# Patient Record
Sex: FEMALE | Race: WHITE
[De-identification: names, ages, dates, MRNs, and addresses within clinical notes are randomized per-mention and may not be internally consistent; named-entity substitution may affect disease eponyms.]

---

## 2017-12-07 ENCOUNTER — HOSPITAL ENCOUNTER (OUTPATIENT)
Dept: HOSPITAL 19 - MC.RAD | Age: 62
End: 2017-12-07
Attending: FAMILY MEDICINE
Payer: COMMERCIAL

## 2017-12-07 DIAGNOSIS — Z12.31: Primary | ICD-10-CM

## 2018-12-10 ENCOUNTER — HOSPITAL ENCOUNTER (OUTPATIENT)
Dept: HOSPITAL 19 - MC.RAD | Age: 63
End: 2018-12-10
Attending: FAMILY MEDICINE
Payer: MEDICARE

## 2018-12-10 DIAGNOSIS — Z12.31: Primary | ICD-10-CM

## 2019-04-16 ENCOUNTER — HOSPITAL ENCOUNTER (EMERGENCY)
Dept: HOSPITAL 19 - COL.ER | Age: 64
Discharge: HOME | End: 2019-04-16
Payer: MEDICARE

## 2019-04-16 VITALS — HEART RATE: 85 BPM

## 2019-04-16 VITALS — DIASTOLIC BLOOD PRESSURE: 73 MMHG | TEMPERATURE: 98 F | SYSTOLIC BLOOD PRESSURE: 164 MMHG

## 2019-04-16 VITALS — HEIGHT: 60.98 IN | WEIGHT: 135.36 LBS | BODY MASS INDEX: 25.56 KG/M2

## 2019-04-16 DIAGNOSIS — S02.2XXA: Primary | ICD-10-CM
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
## 2019-04-16 DIAGNOSIS — I10: ICD-10-CM

## 2019-04-16 DIAGNOSIS — Z88.2: ICD-10-CM

## 2019-04-16 DIAGNOSIS — E11.9: ICD-10-CM
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
## 2019-12-13 ENCOUNTER — HOSPITAL ENCOUNTER (OUTPATIENT)
Dept: HOSPITAL 19 - MC.RAD | Age: 64
End: 2019-12-13
Attending: FAMILY MEDICINE
Payer: MEDICARE

## 2019-12-13 DIAGNOSIS — Z12.31: Primary | ICD-10-CM

## 2020-12-07 ENCOUNTER — HOSPITAL ENCOUNTER (OUTPATIENT)
Dept: HOSPITAL 19 - MC.RAD | Age: 65
End: 2020-12-07
Attending: FAMILY MEDICINE
Payer: MEDICARE

## 2020-12-07 DIAGNOSIS — Z12.31: Primary | ICD-10-CM

## 2021-12-09 ENCOUNTER — HOSPITAL ENCOUNTER (OUTPATIENT)
Dept: HOSPITAL 19 - MC.RAD | Age: 66
End: 2021-12-09
Attending: FAMILY MEDICINE
Payer: MEDICARE

## 2021-12-09 DIAGNOSIS — Z12.31: Primary | ICD-10-CM

## 2022-09-12 ENCOUNTER — HOSPITAL ENCOUNTER (OUTPATIENT)
Dept: HOSPITAL 19 - COL.RAD | Age: 67
End: 2022-09-12
Attending: UROLOGY
Payer: MEDICARE

## 2022-09-12 DIAGNOSIS — Z87.442: Primary | ICD-10-CM

## 2023-03-23 ENCOUNTER — HOSPITAL ENCOUNTER (EMERGENCY)
Dept: HOSPITAL 19 - COL.ER | Age: 68
Discharge: HOME | End: 2023-03-23
Attending: EMERGENCY MEDICINE
Payer: MEDICARE

## 2023-03-23 VITALS — BODY MASS INDEX: 29.52 KG/M2 | HEIGHT: 60 IN | WEIGHT: 150.36 LBS

## 2023-03-23 VITALS — HEART RATE: 97 BPM | SYSTOLIC BLOOD PRESSURE: 155 MMHG | DIASTOLIC BLOOD PRESSURE: 85 MMHG

## 2023-03-23 VITALS — TEMPERATURE: 98.2 F

## 2023-03-23 DIAGNOSIS — D72.829: ICD-10-CM

## 2023-03-23 DIAGNOSIS — M96.89: ICD-10-CM

## 2023-03-23 DIAGNOSIS — E86.0: Primary | ICD-10-CM

## 2023-03-23 DIAGNOSIS — E11.65: ICD-10-CM

## 2023-03-23 DIAGNOSIS — Z28.310: ICD-10-CM

## 2023-03-23 DIAGNOSIS — R79.81: ICD-10-CM

## 2023-03-23 DIAGNOSIS — Z96.652: ICD-10-CM

## 2023-03-23 DIAGNOSIS — Z79.84: ICD-10-CM

## 2023-03-23 DIAGNOSIS — R11.2: ICD-10-CM

## 2023-03-23 LAB
ALBUMIN SERPL-MCNC: 3.4 GM/DL (ref 3.4–4.8)
ALP SERPL-CCNC: 97 U/L (ref 40–150)
ALT SERPL-CCNC: 30 U/L (ref 0–55)
ANION GAP SERPL CALC-SCNC: 22 MMOL/L (ref 7–16)
AST SERPL-CCNC: 13 U/L (ref 5–34)
BILIRUB SERPL-MCNC: 0.5 MG/DL (ref 0.2–1.2)
BUN SERPL-MCNC: 17 MG/DL (ref 10–20)
CALCIUM SERPL-MCNC: 9.6 MG/DL (ref 8.4–10.2)
CHLORIDE SERPL-SCNC: 96 MMOL/L (ref 98–107)
CO2 SERPL-SCNC: 11 MMOL/L (ref 23–31)
CREAT SERPL-SCNC: 1.05 MG/DL (ref 0.57–1.11)
ERYTHROCYTE [DISTWIDTH] IN BLOOD BY AUTOMATED COUNT: 12.8 % (ref 11.5–14.5)
GLUCOSE SERPL-MCNC: 327 MG/DL (ref 70–99)
HCT VFR BLD AUTO: 39.9 % (ref 37–47)
HGB BLD-MCNC: 13.1 G/DL (ref 12.5–16)
LYMPHOCYTES NFR BLD MANUAL: 9 % (ref 20–51)
MCH RBC QN AUTO: 32 PG (ref 27–31)
MCHC RBC AUTO-ENTMCNC: 33 G/DL (ref 33–37)
MCV RBC AUTO: 98 FL (ref 80–100)
MONOCYTES NFR BLD: 5 % (ref 1.7–9.3)
NEUTS BAND NFR BLD: 10 % (ref 0–10)
NEUTS SEG NFR BLD MANUAL: 76 % (ref 42–75.2)
PLATELET # BLD AUTO: 388 K/MM3 (ref 130–400)
PLATELET BLD QL SMEAR: (no result)
PMV BLD AUTO: 9 FL (ref 7.4–10.4)
POTASSIUM SERPL-SCNC: 4.6 MMOL/L (ref 3.5–4.5)
PROT SERPL-MCNC: 7.2 GM/DL (ref 6.2–8.1)
RBC # BLD AUTO: 4.08 M/MM3 (ref 4.1–5.3)
SODIUM SERPL-SCNC: 129 MMOL/L (ref 136–145)

## 2023-03-25 ENCOUNTER — HOSPITAL ENCOUNTER (INPATIENT)
Dept: HOSPITAL 19 - COL.ER | Age: 68
LOS: 3 days | Discharge: NURSING, REHAB OR LONG-TERM CARE | DRG: 637 | End: 2023-03-28
Attending: INTERNAL MEDICINE | Admitting: INTERNAL MEDICINE
Payer: MEDICARE

## 2023-03-25 VITALS — HEART RATE: 108 BPM | DIASTOLIC BLOOD PRESSURE: 54 MMHG | TEMPERATURE: 98.7 F | SYSTOLIC BLOOD PRESSURE: 117 MMHG

## 2023-03-25 VITALS — TEMPERATURE: 99 F | DIASTOLIC BLOOD PRESSURE: 54 MMHG | SYSTOLIC BLOOD PRESSURE: 137 MMHG | HEART RATE: 100 BPM

## 2023-03-25 VITALS — SYSTOLIC BLOOD PRESSURE: 131 MMHG | TEMPERATURE: 98.8 F | HEART RATE: 99 BPM | DIASTOLIC BLOOD PRESSURE: 68 MMHG

## 2023-03-25 VITALS — BODY MASS INDEX: 28.39 KG/M2 | WEIGHT: 150.36 LBS | HEIGHT: 60.98 IN

## 2023-03-25 VITALS — HEART RATE: 105 BPM | SYSTOLIC BLOOD PRESSURE: 134 MMHG | TEMPERATURE: 98.6 F | DIASTOLIC BLOOD PRESSURE: 52 MMHG

## 2023-03-25 VITALS — TEMPERATURE: 97.5 F | SYSTOLIC BLOOD PRESSURE: 126 MMHG | HEART RATE: 111 BPM | DIASTOLIC BLOOD PRESSURE: 51 MMHG

## 2023-03-25 DIAGNOSIS — Z90.49: ICD-10-CM

## 2023-03-25 DIAGNOSIS — Z79.899: ICD-10-CM

## 2023-03-25 DIAGNOSIS — N17.9: ICD-10-CM

## 2023-03-25 DIAGNOSIS — Z23: ICD-10-CM

## 2023-03-25 DIAGNOSIS — Z20.822: ICD-10-CM

## 2023-03-25 DIAGNOSIS — Z88.2: ICD-10-CM

## 2023-03-25 DIAGNOSIS — Z79.82: ICD-10-CM

## 2023-03-25 DIAGNOSIS — Z96.652: ICD-10-CM

## 2023-03-25 DIAGNOSIS — E11.65: ICD-10-CM

## 2023-03-25 DIAGNOSIS — Z90.710: ICD-10-CM

## 2023-03-25 DIAGNOSIS — T41.45XA: ICD-10-CM

## 2023-03-25 DIAGNOSIS — I10: ICD-10-CM

## 2023-03-25 DIAGNOSIS — E11.10: Primary | ICD-10-CM

## 2023-03-25 DIAGNOSIS — Z79.890: ICD-10-CM

## 2023-03-25 DIAGNOSIS — Z90.89: ICD-10-CM

## 2023-03-25 DIAGNOSIS — Z88.0: ICD-10-CM

## 2023-03-25 DIAGNOSIS — E87.6: ICD-10-CM

## 2023-03-25 DIAGNOSIS — Z79.4: ICD-10-CM

## 2023-03-25 DIAGNOSIS — E83.42: ICD-10-CM

## 2023-03-25 DIAGNOSIS — G93.41: ICD-10-CM

## 2023-03-25 DIAGNOSIS — T50.995A: ICD-10-CM

## 2023-03-25 DIAGNOSIS — E03.9: ICD-10-CM

## 2023-03-25 DIAGNOSIS — Z88.8: ICD-10-CM

## 2023-03-25 DIAGNOSIS — D64.9: ICD-10-CM

## 2023-03-25 LAB
ACETONE SERPL-MCNC: (no result) MG/DL
ALBUMIN SERPL-MCNC: 2.9 GM/DL (ref 3.4–4.8)
ALP SERPL-CCNC: 101 U/L (ref 40–150)
ALT SERPL-CCNC: 19 U/L (ref 0–55)
ANION GAP SERPL CALC-SCNC: 11 MMOL/L (ref 7–16)
ANION GAP SERPL CALC-SCNC: 15 MMOL/L (ref 7–16)
ANION GAP SERPL CALC-SCNC: 17 MMOL/L (ref 7–16)
AST SERPL-CCNC: 11 U/L (ref 5–34)
BASOPHILS # BLD: 0.1 K/MM3 (ref 0–0.2)
BASOPHILS NFR BLD AUTO: 0.5 % (ref 0–2)
BILIRUB SERPL-MCNC: 0.4 MG/DL (ref 0.2–1.2)
BUN SERPL-MCNC: 13 MG/DL (ref 10–20)
BUN SERPL-MCNC: 17 MG/DL (ref 10–20)
BUN SERPL-MCNC: 19 MG/DL (ref 10–20)
CALCIUM SERPL-MCNC: 10.5 MG/DL (ref 8.4–10.2)
CALCIUM SERPL-MCNC: 8.7 MG/DL (ref 8.4–10.2)
CALCIUM SERPL-MCNC: 9.8 MG/DL (ref 8.4–10.2)
CHLORIDE SERPL-SCNC: 103 MMOL/L (ref 98–107)
CHLORIDE SERPL-SCNC: 104 MMOL/L (ref 98–107)
CHLORIDE SERPL-SCNC: 111 MMOL/L (ref 98–107)
CO2 SERPL-SCNC: 10 MMOL/L (ref 23–31)
CO2 SERPL-SCNC: 12 MMOL/L (ref 23–31)
CO2 SERPL-SCNC: 14 MMOL/L (ref 23–31)
CREAT SERPL-SCNC: 0.91 MG/DL (ref 0.57–1.11)
CREAT SERPL-SCNC: 1.13 MG/DL (ref 0.57–1.11)
CREAT SERPL-SCNC: 1.33 MG/DL (ref 0.57–1.11)
EOSINOPHIL # BLD: 0.1 K/MM3 (ref 0–0.7)
EOSINOPHIL NFR BLD: 0.6 % (ref 0–4)
ERYTHROCYTE [DISTWIDTH] IN BLOOD BY AUTOMATED COUNT: 13 % (ref 11.5–14.5)
GLUCOSE SERPL-MCNC: 141 MG/DL (ref 70–99)
GLUCOSE SERPL-MCNC: 328 MG/DL (ref 70–99)
GLUCOSE SERPL-MCNC: 383 MG/DL (ref 70–99)
GLUCOSE UR QL STRIP.AUTO: (no result)
GRAN CASTS #/AREA URNS LPF: (no result) /LPF
GRANULOCYTES # BLD AUTO: 79.4 % (ref 42.2–75.2)
HCT VFR BLD AUTO: 37.8 % (ref 37–47)
HGB BLD-MCNC: 12.1 G/DL (ref 12.5–16)
KETONES UR STRIP.AUTO-MCNC: (no result) MG/DL
LIPASE SERPL-CCNC: 11 U/L (ref 8–78)
LYMPHOCYTES # BLD: 1.1 K/MM3 (ref 1.2–3.4)
LYMPHOCYTES NFR BLD: 8.1 % (ref 20–51)
MAGNESIUM SERPL-MCNC: 1.5 MG/DL (ref 1.6–2.6)
MCH RBC QN AUTO: 31 PG (ref 27–31)
MCHC RBC AUTO-ENTMCNC: 32 G/DL (ref 33–37)
MCV RBC AUTO: 98 FL (ref 80–100)
MONOCYTES # BLD: 1.5 K/MM3 (ref 0.1–0.6)
MONOCYTES NFR BLD AUTO: 11 % (ref 1.7–9.3)
NEUTROPHILS # BLD: 10.7 K/MM3 (ref 1.4–6.5)
PH UR STRIP.AUTO: 5.5 [PH] (ref 5–8.5)
PLATELET # BLD AUTO: 402 K/MM3 (ref 130–400)
PMV BLD AUTO: 9 FL (ref 7.4–10.4)
POTASSIUM SERPL-SCNC: 3.4 MMOL/L (ref 3.5–4.5)
POTASSIUM SERPL-SCNC: 4.3 MMOL/L (ref 3.5–4.5)
POTASSIUM SERPL-SCNC: 4.8 MMOL/L (ref 3.5–4.5)
PROT SERPL-MCNC: 6.7 GM/DL (ref 6.2–8.1)
RBC # BLD AUTO: 3.86 M/MM3 (ref 4.1–5.3)
RBC # UR STRIP.AUTO: (no result) /UL
SODIUM SERPL-SCNC: 130 MMOL/L (ref 136–145)
SODIUM SERPL-SCNC: 131 MMOL/L (ref 136–145)
SODIUM SERPL-SCNC: 136 MMOL/L (ref 136–145)
SP GR UR STRIP.AUTO: 1.01 (ref 1–1.03)
SQUAMOUS # URNS: (no result) /HPF (ref 0–10)
TROPONIN I SERPL-MCNC: < 0.01 NG/ML (ref 0–0.03)
TSH SERPL DL<=0.005 MIU/L-ACNC: 1.64 UIU/ML (ref 0.35–4.94)
UA DIPSTICK PNL UR STRIP.AUTO: (no result)
URN COLLECT METHOD CLASS: (no result)
UROBILINOGEN UR STRIP.AUTO-MCNC: 0.2 E.U/DL (ref 0.2–1)

## 2023-03-25 NOTE — NUR
PT recently up from ED.  Pt is alert and oriented although she is slow to
answer.  When she arrived to the floor, assisted her to the restroom.  She was
slow at moving, but improved during transfer.  Left knee is stiff, encouraged
her to bend her knee as much as she can when walking as she was trying to keep
it straight.  Family stated that she appears to be a little more perky now
than when they first brought her in.  IVf to IV in right AC.  Bed alarm on.

## 2023-03-25 NOTE — NUR
Pt using commode as she is having complaints of pain when trying to bend the
left knee.  Encouraged pt to work on heel slides while in bed to start
bending her knee.  Pt does have bruising to her left thigh and bruising to
left knee.  Pt continues to answer most questions appropriately, but does
struggle with following commands.  When asking her to do something, i have to
repeat myself several times.  Bed alarm on, call light within reach

## 2023-03-25 NOTE — NUR
Pt  is at bedside now.  Pt using commode as she is weak and tires
quickly.  Pt did walk to the sink and brush her teeth.  Pt still not
completely comprehending what is asked of her.  Have to repeat myself and
demonstrate sometimes what i am asking of her.  I have been ordering small
more frequent meals for her and she has been tolerating her meals

## 2023-03-25 NOTE — NUR
met with patient for intake assessment/discharge planning.
Patient presents alert and is oriented X 3, she is able to respond to
assessment inquiries though requires some repetition and demonstrates a slowed
response. He states she lives in [place], [place], in a 1-story home. She reports no
use of DME and/or oxygen and she has been independent in her ADLs/IADLs, up to
this point. She is aware of her confusion/altered mental status, becoming
tearful stating, "This is not me." She does not know what is happening to her
to be confused. Patient's primary care physician is Dr. Flaco Martinez and she
obtains her medications without difficulty at [facility]. She
struggles to find her daughter's telephone contact in her phone, Elsie Ramos. She states, "I can't find it." She does not wish for help at this time
to search her phone. She states she does have a [employer]arde[employer] system for blood sugar
monitoring at home. Patient is unable to identify any needs for discharge at
this time, "I don't know. I can't think right now." She expresses frustration
as she is employed and educated.
 
Leanna MARTE is updated.
 
*Discharge plan: re-assess patient for identified needs upon medical
clearance*

## 2023-03-25 NOTE — NUR
PT ASSISTED TO BSC WITH 1 ASSIST/GAIT BELT/WALKER, DOES FAIR. NEEDS CUEING TO
MOVE LEFT LEG AND HOW TO SIT ON BSC. VOIDS AND BACK TO BED. LEFT KNEE WITH
AQUACEL DRSG D/I. MILD EDEMA NOTED TO LT KNEE. PLACED NEW ICE PACK ON SITE.
HAS IVF AT 250CC/HR TO RAC, INFUSING WITHOUT REDNESS OR SWELLING. PT ASKS FOR
A CATHETER, EDUCATED ON IMPORTANCE OF PREVENTING INFECTION AND MOBILITY,
VERBALIZED UNDERSTANDING. TAKES SCHEDULED ES TYLENOL WITHOUT PROBLEM.

## 2023-03-26 VITALS — SYSTOLIC BLOOD PRESSURE: 128 MMHG | TEMPERATURE: 97.9 F | HEART RATE: 97 BPM | DIASTOLIC BLOOD PRESSURE: 56 MMHG

## 2023-03-26 VITALS — TEMPERATURE: 98.2 F | HEART RATE: 94 BPM | DIASTOLIC BLOOD PRESSURE: 64 MMHG | SYSTOLIC BLOOD PRESSURE: 158 MMHG

## 2023-03-26 VITALS — HEART RATE: 98 BPM | TEMPERATURE: 98.9 F | DIASTOLIC BLOOD PRESSURE: 64 MMHG | SYSTOLIC BLOOD PRESSURE: 152 MMHG

## 2023-03-26 VITALS — TEMPERATURE: 98.8 F | HEART RATE: 100 BPM | SYSTOLIC BLOOD PRESSURE: 146 MMHG | DIASTOLIC BLOOD PRESSURE: 69 MMHG

## 2023-03-26 VITALS — TEMPERATURE: 98.3 F | HEART RATE: 93 BPM | SYSTOLIC BLOOD PRESSURE: 135 MMHG | DIASTOLIC BLOOD PRESSURE: 65 MMHG

## 2023-03-26 LAB
ANION GAP SERPL CALC-SCNC: 6 MMOL/L (ref 7–16)
ANION GAP SERPL CALC-SCNC: 8 MMOL/L (ref 7–16)
BASOPHILS # BLD: 0.1 K/MM3 (ref 0–0.2)
BASOPHILS NFR BLD AUTO: 0.7 % (ref 0–2)
BUN SERPL-MCNC: 10 MG/DL (ref 10–20)
BUN SERPL-MCNC: 9 MG/DL (ref 10–20)
CALCIUM SERPL-MCNC: 8 MG/DL (ref 8.4–10.2)
CALCIUM SERPL-MCNC: 8.1 MG/DL (ref 8.4–10.2)
CHLORIDE SERPL-SCNC: 115 MMOL/L (ref 98–107)
CHLORIDE SERPL-SCNC: 116 MMOL/L (ref 98–107)
CO2 SERPL-SCNC: 15 MMOL/L (ref 23–31)
CO2 SERPL-SCNC: 16 MMOL/L (ref 23–31)
CREAT SERPL-SCNC: 0.71 MG/DL (ref 0.57–1.11)
CREAT SERPL-SCNC: 0.75 MG/DL (ref 0.57–1.11)
EOSINOPHIL # BLD: 0.2 K/MM3 (ref 0–0.7)
EOSINOPHIL NFR BLD: 2 % (ref 0–4)
ERYTHROCYTE [DISTWIDTH] IN BLOOD BY AUTOMATED COUNT: 13.2 % (ref 11.5–14.5)
GLUCOSE SERPL-MCNC: 115 MG/DL (ref 70–99)
GLUCOSE SERPL-MCNC: 79 MG/DL (ref 70–99)
GRANULOCYTES # BLD AUTO: 70 % (ref 42.2–75.2)
HCT VFR BLD AUTO: 28.2 % (ref 37–47)
HGB BLD-MCNC: 9.4 G/DL (ref 12.5–16)
LYMPHOCYTES # BLD: 1.1 K/MM3 (ref 1.2–3.4)
LYMPHOCYTES NFR BLD: 14.8 % (ref 20–51)
MAGNESIUM SERPL-MCNC: 2.1 MG/DL (ref 1.6–2.6)
MCH RBC QN AUTO: 31 PG (ref 27–31)
MCHC RBC AUTO-ENTMCNC: 33 G/DL (ref 33–37)
MCV RBC AUTO: 94 FL (ref 80–100)
MONOCYTES # BLD: 0.9 K/MM3 (ref 0.1–0.6)
MONOCYTES NFR BLD AUTO: 12.1 % (ref 1.7–9.3)
NEUTROPHILS # BLD: 5.2 K/MM3 (ref 1.4–6.5)
PLATELET # BLD AUTO: 356 K/MM3 (ref 130–400)
PMV BLD AUTO: 9.2 FL (ref 7.4–10.4)
POTASSIUM SERPL-SCNC: 3.8 MMOL/L (ref 3.5–4.5)
POTASSIUM SERPL-SCNC: 4.1 MMOL/L (ref 3.5–4.5)
RBC # BLD AUTO: 3 M/MM3 (ref 4.1–5.3)
SODIUM SERPL-SCNC: 138 MMOL/L (ref 136–145)
SODIUM SERPL-SCNC: 138 MMOL/L (ref 136–145)

## 2023-03-26 NOTE — NUR
PT ASSISTED TO BSC WITH ONE ASSIST, VOIDS AND BACK TO BED. DENIES NEED FOR
PAIN MEDS AT THIS TIME. POTASSIUM REPLACEMENT COMPLETE. IVF TO RAC INFUSING AT
250CC/HR.

## 2023-03-26 NOTE — NUR
Pt has continued to improve throughout the afternoon.  Some things she is
still forgetful on and she does get her words mixed up at times.  When this
happens, she does correct herself right away.  Spouse has been here most of
the afternoon as well as several other visitors.  Pt tolerating her food
although she is not eating much at a time.  She is ambulating to the restroom
using a walker.  No needs, will continue to monitor

## 2023-03-26 NOTE — NUR
Pt appears to be doing much better than yesterday.  She is following
directions and is getting up out of bed easier.  Assisted pt to the commode,
voided without difficulty.  Assisted with ordering breakfast

## 2023-03-26 NOTE — NUR
Pt blood sugar 67, pct gave pt orange juice per pt request.  Will recheck bs
prior to giving the glucose tabs since she had the orange juice.

## 2023-03-26 NOTE — NUR
PT REPORTS FEELING LIKE SHE HAS A LOW BLOOD SUGAR, SHE IS SWEATY. BS=39. GIVEN
IV DEXTROSE 12.5GM NOW.

## 2023-03-26 NOTE — NUR
REPEAT BS=95MG/DL. PT HAS BEEN UP TO BATHROOM, VOIDS AND BACK TO BED. GAIT
STEADY. SIGNIFICANT BRUISING TO LT INNER THIGH TO CALF. RESTARTED IV SITE TO
LT WRIST, #20 INSYTE ON FIRST ATTEMPT. DC'D RAC SITE, ANGIOCATH INTACT. IVF
INFUSING AT 150CC/HR. PT FEELS BETTER. DID DRINK AN ORANGE JUICE PER HER
REQUEST.

## 2023-03-26 NOTE — NUR
PT ASSISTED TO BSC WITH ONE ASSIST/GAIT BELT/WALKER. DOES WELL, SLOW AND
STEADY. PT IS MORE AWAKE AND IS MOVING MUCH BETTER THAN BEGINNING OF SHIFT. PT
REPORTS MINIMAL PAIN TO LEFT KNEE, ONLY WITH ACTIVITY. IVF CONTINUE WITH
POTASSIUM REPLACEMENT TO RAC, SITE WITHOUT REDNESS OR SWELLING. BACK TO BED.
VOIDS LIGHT YELLOW URINE.

## 2023-03-26 NOTE — NUR
Pt continues to improve.  PT has been in and worked with her. Pts spouse is at
bedside at this time, gave him an update.  Dr Garcia in to see pt, IVF
decreased per order.  Pt ambulating to the restroom now with one assist.  Pt
tolerable

## 2023-03-27 VITALS — TEMPERATURE: 98.3 F | DIASTOLIC BLOOD PRESSURE: 77 MMHG | SYSTOLIC BLOOD PRESSURE: 157 MMHG | HEART RATE: 98 BPM

## 2023-03-27 VITALS — TEMPERATURE: 98.7 F | SYSTOLIC BLOOD PRESSURE: 154 MMHG | DIASTOLIC BLOOD PRESSURE: 78 MMHG | HEART RATE: 83 BPM

## 2023-03-27 VITALS — HEART RATE: 94 BPM | SYSTOLIC BLOOD PRESSURE: 140 MMHG | DIASTOLIC BLOOD PRESSURE: 78 MMHG | TEMPERATURE: 97.9 F

## 2023-03-27 VITALS — HEART RATE: 97 BPM | TEMPERATURE: 98.5 F | SYSTOLIC BLOOD PRESSURE: 150 MMHG | DIASTOLIC BLOOD PRESSURE: 70 MMHG

## 2023-03-27 VITALS — DIASTOLIC BLOOD PRESSURE: 67 MMHG | SYSTOLIC BLOOD PRESSURE: 161 MMHG | TEMPERATURE: 98.5 F | HEART RATE: 100 BPM

## 2023-03-27 VITALS — SYSTOLIC BLOOD PRESSURE: 145 MMHG | TEMPERATURE: 98.6 F | DIASTOLIC BLOOD PRESSURE: 87 MMHG | HEART RATE: 91 BPM

## 2023-03-27 VITALS — HEART RATE: 97 BPM | SYSTOLIC BLOOD PRESSURE: 135 MMHG | DIASTOLIC BLOOD PRESSURE: 62 MMHG | TEMPERATURE: 98.5 F

## 2023-03-27 LAB
ANION GAP SERPL CALC-SCNC: 10 MMOL/L (ref 7–16)
BUN SERPL-MCNC: 6 MG/DL (ref 10–20)
CALCIUM SERPL-MCNC: 8.5 MG/DL (ref 8.4–10.2)
CHLORIDE SERPL-SCNC: 111 MMOL/L (ref 98–107)
CO2 SERPL-SCNC: 22 MMOL/L (ref 23–31)
CREAT SERPL-SCNC: 0.6 MG/DL (ref 0.57–1.11)
GLUCOSE SERPL-MCNC: 139 MG/DL (ref 70–99)
MAGNESIUM SERPL-MCNC: 1.5 MG/DL (ref 1.6–2.6)
POTASSIUM SERPL-SCNC: 3.3 MMOL/L (ref 3.5–4.5)
SODIUM SERPL-SCNC: 143 MMOL/L (ref 136–145)

## 2023-03-27 NOTE — NUR
Initial visit; Patient talked and was receptive to  visit. Patient
thanked  for her honesty and for keeping her in her prayers. 
mentioned it is a blessing for her to do what she does; especially meeting
good people.

## 2023-03-27 NOTE — NUR
met with patient to follow up on discharge plan. Patient plans
to return home at time of discharge with her , Reno. JENNI discussed Home
Health services with patient who advised she may need these services, but
would like to speak with [place] about it first before deciding. JENNI provided
[email or web address].gov list of HH agencies that serve her area.
 
Discharge Plan: Home with HH

## 2023-03-27 NOTE — NUR
Patient requested to meet with  to discuss discharge plan.
Patient advised her children thought she needed to look into SNF. SW provided
Medicare.gov list of SNFs for patient and she advised she would review this
with her  and talk with SW in the morning.

## 2023-03-27 NOTE — NUR
PT DEXCOM ALERTING, PT ASKING FOR OJ AND PEANUT BUTTER FOR LOW READING OF 66
ON HER MONITOR. IVF INFUSING TO LFA WITHOUT PROBLEM. VOIDING WELL, GAIT STEADY
WITH WALKER AND ASSIST OF 1.

## 2023-03-27 NOTE — NUR
PT WORKING WITH THERAPY THIS AM. POSSIBLE DISCHARGE LATER TODAY. DRESSING TO
LEFT KNEE CDI. UP TO SHOWER WITH ASSIST. PT TOLERATING WELL.

## 2023-03-28 VITALS — SYSTOLIC BLOOD PRESSURE: 160 MMHG | DIASTOLIC BLOOD PRESSURE: 72 MMHG | TEMPERATURE: 97.8 F | HEART RATE: 81 BPM

## 2023-03-28 VITALS — SYSTOLIC BLOOD PRESSURE: 160 MMHG | DIASTOLIC BLOOD PRESSURE: 80 MMHG | TEMPERATURE: 97.6 F | HEART RATE: 90 BPM

## 2023-03-28 VITALS — TEMPERATURE: 98 F | HEART RATE: 87 BPM | SYSTOLIC BLOOD PRESSURE: 156 MMHG | DIASTOLIC BLOOD PRESSURE: 69 MMHG

## 2023-03-28 LAB
ANION GAP SERPL CALC-SCNC: 9 MMOL/L (ref 7–16)
BASOPHILS # BLD: 0.1 K/MM3 (ref 0–0.2)
BASOPHILS NFR BLD AUTO: 0.8 % (ref 0–2)
BUN SERPL-MCNC: 8 MG/DL (ref 10–20)
CALCIUM SERPL-MCNC: 8.6 MG/DL (ref 8.4–10.2)
CHLORIDE SERPL-SCNC: 106 MMOL/L (ref 98–107)
CO2 SERPL-SCNC: 26 MMOL/L (ref 23–31)
CREAT SERPL-SCNC: 0.67 MG/DL (ref 0.57–1.11)
EOSINOPHIL # BLD: 0.2 K/MM3 (ref 0–0.7)
EOSINOPHIL NFR BLD: 2.6 % (ref 0–4)
ERYTHROCYTE [DISTWIDTH] IN BLOOD BY AUTOMATED COUNT: 13.4 % (ref 11.5–14.5)
GLUCOSE SERPL-MCNC: 180 MG/DL (ref 70–99)
GRANULOCYTES # BLD AUTO: 65 % (ref 42.2–75.2)
HCT VFR BLD AUTO: 31.4 % (ref 37–47)
HGB BLD-MCNC: 10.3 G/DL (ref 12.5–16)
LYMPHOCYTES # BLD: 1.5 K/MM3 (ref 1.2–3.4)
LYMPHOCYTES NFR BLD: 20.7 % (ref 20–51)
MAGNESIUM SERPL-MCNC: 1.6 MG/DL (ref 1.6–2.6)
MCH RBC QN AUTO: 31 PG (ref 27–31)
MCHC RBC AUTO-ENTMCNC: 33 G/DL (ref 33–37)
MCV RBC AUTO: 95 FL (ref 80–100)
MONOCYTES # BLD: 0.7 K/MM3 (ref 0.1–0.6)
MONOCYTES NFR BLD AUTO: 10.2 % (ref 1.7–9.3)
NEUTROPHILS # BLD: 4.7 K/MM3 (ref 1.4–6.5)
PLATELET # BLD AUTO: 438 K/MM3 (ref 130–400)
PMV BLD AUTO: 9.2 FL (ref 7.4–10.4)
POTASSIUM SERPL-SCNC: 3.3 MMOL/L (ref 3.5–4.5)
RBC # BLD AUTO: 3.31 M/MM3 (ref 4.1–5.3)
SODIUM SERPL-SCNC: 141 MMOL/L (ref 136–145)

## 2023-03-28 NOTE — NUR
Patient sitting up in a recliner next to bed. Patient alert and oriented.
Dressing to left knee intact. Pulses present. Patient denies pain at this
time. Family at the bedside. See process intervention for notes.

## 2023-03-28 NOTE — NUR
On 03/27/23,  took a call from patient's daughter, Amanda who
requested SNF placement and advised [facility]
preference.
 
JENNI met with patient and her spouse, [place]o on 03/28/23 before rounding and
confirmed the above. Patient stated she was open to sending additional
referrals to [facility] and [facility] as second preferences. JENNI faxed referrals to
[facility], [facility], and [facility]. Kandy at [facility] reviewed referral and
advised her team can accept. JENNI updated Hospitalist who is ready to discharge
patient today. JENNI faxed discharge orders to Kandy and transport time was set
for 1330. JENNI met with patient to provide transport time and to review IM.
Patient verbalized understanding of IM and provided signature. JENNI placed form
in chart and gave copy to patient. JENNI contacted patient's daughter, Amanda to
update on discharge plan and transport time. Amanda expressed that she is happy
with this plan.
 
Discharge Plan: [facility]

## 2023-03-28 NOTE — NUR
pt alert and oriented x4, pain controlled with scheduled tylenol and prn
oxycodone. HS , levemir held. up to restroom with walker and SBA.

## 2023-03-28 NOTE — NUR
Patient transfered to [facility]. INT and tele monitor
discontinued. Report given to the nurse at facility, and has no other
questions.